# Patient Record
Sex: MALE | Race: WHITE | Employment: FULL TIME | URBAN - METROPOLITAN AREA
[De-identification: names, ages, dates, MRNs, and addresses within clinical notes are randomized per-mention and may not be internally consistent; named-entity substitution may affect disease eponyms.]

---

## 2017-04-11 ENCOUNTER — OFFICE VISIT (OUTPATIENT)
Dept: FAMILY MEDICINE CLINIC | Facility: CLINIC | Age: 37
End: 2017-04-11

## 2017-04-11 VITALS
HEART RATE: 76 BPM | TEMPERATURE: 99 F | DIASTOLIC BLOOD PRESSURE: 76 MMHG | SYSTOLIC BLOOD PRESSURE: 122 MMHG | RESPIRATION RATE: 18 BRPM | WEIGHT: 203 LBS | OXYGEN SATURATION: 98 % | BODY MASS INDEX: 29 KG/M2

## 2017-04-11 DIAGNOSIS — J30.2 SEASONAL ALLERGIC RHINITIS, UNSPECIFIED ALLERGIC RHINITIS TRIGGER: Primary | ICD-10-CM

## 2017-04-11 PROBLEM — Z72.0 TOBACCO USE: Status: ACTIVE | Noted: 2017-04-11

## 2017-04-11 PROCEDURE — 99213 OFFICE O/P EST LOW 20 MIN: CPT | Performed by: NURSE PRACTITIONER

## 2017-04-11 RX ORDER — FLUTICASONE PROPIONATE 50 MCG
SPRAY, SUSPENSION (ML) NASAL
Qty: 1 BOTTLE | Refills: 0 | Status: SHIPPED | OUTPATIENT
Start: 2017-04-11

## 2017-04-11 NOTE — PATIENT INSTRUCTIONS
· Start daily antihistamine: Claritin, Zyrtec, Allegra are all good options. Generic is fine. · Use nasal spray daily  · Avoid allergy triggers  · Follow up if symptoms worsen or new onset of fever  What Causes Springtime Allergies?    Spring allergies a In most areas, pollen is measured and counted, with the different types of pollen identified. This may be reported in terms of trees, weeds and grasses, or may be further divided into the types of trees and weeds identified.  Specific grasses are not usuall DISCLAIMER: The information contained in this article is for educational purposes only, and should not be used as a substitute for personal care by a licensed physician.  Please see your physician for diagnosis and treatment of any concerning symptoms or me · If possible, sleep in a room with no carpet, curtains, or upholstered furniture. Cockroaches:  · Store food in sealed containers. · Remove garbage from the home promptly.   · Fix water leaks  Mold:  · Keep humidity low by using a dehumidifier or air con

## 2017-04-11 NOTE — PROGRESS NOTES
CHIEF COMPLAINT:   Patient presents with:  Cough: for last 5 days, thnks it may be post nasal drip        HPI:     The patient complains of allergy symptoms. Has had for 5.  Symptoms include: nasal congestion,clear rhinorrhea,nasal itching, sneezing, plugge Ag Theodore is a 43year old male who presents with allergy symptoms.     ASSESSMENT:  Seasonal allergic rhinitis, unspecified allergic rhinitis trigger  (primary encounter diagnosis)    PLAN:  · Start daily antihistamine: Claritin, Zyrtec, Allegra are In most areas, pollen is measured and counted, with the different types of pollen identified. This may be reported in terms of trees, weeds and grasses, or may be further divided into the types of trees and weeds identified.  Specific grasses are not usuall DISCLAIMER: The information contained in this article is for educational purposes only, and should not be used as a substitute for personal care by a licensed physician.  Please see your physician for diagnosis and treatment of any concerning symptoms or me Grasses can be divided into two major classes, northern and [place] grasses. Northern grasses are common in colder climates, and include iain, rye, orchard, sweet vernal, red top, and blue grasses.  Southern grasses are present in warmer climates, with Stay indoors when the pollen count is reported to be high, and on windy days when pollen may be present in higher amounts in the air   Take a vacation during the height of the pollen season to a more pollen-free area, such as the beach or sea.    Avoid fres 10. If you cannot avoid having a pet, keep it out of your bedroom and off upholstered furniture. Pollen:  · When pollen counts are high, keep windows of your car and home closed. If possible, use an air conditioner instead.   · Wear a filter mask when mowi

## 2017-10-20 ENCOUNTER — OFFICE VISIT (OUTPATIENT)
Dept: FAMILY MEDICINE CLINIC | Facility: CLINIC | Age: 37
End: 2017-10-20

## 2017-10-20 VITALS
TEMPERATURE: 98 F | SYSTOLIC BLOOD PRESSURE: 128 MMHG | DIASTOLIC BLOOD PRESSURE: 86 MMHG | HEART RATE: 98 BPM | WEIGHT: 195 LBS | RESPIRATION RATE: 20 BRPM | OXYGEN SATURATION: 99 % | BODY MASS INDEX: 27.92 KG/M2 | HEIGHT: 70 IN

## 2017-10-20 DIAGNOSIS — J02.9 PHARYNGITIS, UNSPECIFIED ETIOLOGY: Primary | ICD-10-CM

## 2017-10-20 PROCEDURE — 87880 STREP A ASSAY W/OPTIC: CPT | Performed by: NURSE PRACTITIONER

## 2017-10-20 PROCEDURE — 99213 OFFICE O/P EST LOW 20 MIN: CPT | Performed by: NURSE PRACTITIONER

## 2017-10-20 NOTE — PROGRESS NOTES
CHIEF COMPLAINT:   Patient presents with:  Sore Throat        HPI:   Samuel Escalera is a 42year old male presents to clinic with complaint of sore throat. Patient has had for 3 days. Symptoms have been worsening since onset.   Patient reports following hepatosplenomegaly, or tenderness on direct palpation  EXTREMITIES: no cyanosis, clubbing or edema  LYMPH: No anterior cervical. No submandibular lymphadenopathy. No posterior cervical or occipital lymphadenopathy.       Recent Results (from the past 24 ho

## (undated) NOTE — MR AVS SNAPSHOT
[facility] [address]  [phone]               Thank you for choosing us for your health care visit with Tom Estrella NP.   We are glad to serve you and happy to provide you with this summary of your [place] Mustache pollen is the main cause of seasonal allergy in the late summer and early fall. Depending on the area of [place], these weeds include ragweed, sagebrush, pigweed, tumbleweed ([place] thistle), and [place].  In some areas of the world, some tree Source: American [employer]. [employer].    DISCLAIMER: The information contained in this article is for educational purposes only, and should not be used as a substitu · Cover the mattress, box spring, and pillows with allergy covers.   · If possible, sleep in a room with no carpet, curtains, or upholstered furniture. Cockroaches:  · Store food in sealed containers. · Remove garbage from the home promptly.   · Fix water Commonly known as:  FLONASE           * Fluticasone Propionate 50 MCG/ACT Susp   2 sprays in each nostril for 14 days. What changed: You were already taking a medication with the same name, and this prescription was added.  Make sure you understand how a EAT THESE FOODS MORE OFTEN: EAT THESE FOODS LESS OFTEN:   Make half your plate fruits and vegetables Highly refined, white starches including white bread, rice and pasta   Eat plenty of protein, keep the fat content low Sugars:  sodas and sports drinks, ca